# Patient Record
Sex: FEMALE | Race: BLACK OR AFRICAN AMERICAN
[De-identification: names, ages, dates, MRNs, and addresses within clinical notes are randomized per-mention and may not be internally consistent; named-entity substitution may affect disease eponyms.]

---

## 2020-03-23 ENCOUNTER — HOSPITAL ENCOUNTER (OUTPATIENT)
Dept: HOSPITAL 63 - EKG | Age: 70
Discharge: HOME | End: 2020-03-23
Attending: FAMILY MEDICINE
Payer: MEDICARE

## 2020-03-23 DIAGNOSIS — I44.4: Primary | ICD-10-CM

## 2020-03-23 DIAGNOSIS — R94.31: ICD-10-CM

## 2020-03-23 DIAGNOSIS — J45.991: ICD-10-CM

## 2020-03-23 PROCEDURE — 71046 X-RAY EXAM CHEST 2 VIEWS: CPT

## 2020-03-23 PROCEDURE — 93005 ELECTROCARDIOGRAM TRACING: CPT

## 2020-03-23 NOTE — EKG
[facility] [address], [place] [address]

Test Date:    2020               Test Time:    17:41:54

Pat Name:     MARLENE CLAIRE        Department:   

Patient ID:   [id number]           Room:          

Gender:       F                        Technician:   

:          1950               Requested By: DAVY VALLE

Order Number: [id number]            Reading MD:     

                                 Measurements

Intervals                              Axis          

Rate:         70                       P:            40

WY:           130                      QRS:          -42

QRSD:         90                       T:            -9

QT:           374                                    

QTc:          407                                    

                           Interpretive Statements

SINUS RHYTHM

ABNORMAL LEFT AXIS DEVIATION

LEFT ANTERIOR FASCICULAR BLOCK

CONSIDER LEFT VENTRICULAR HYPERTROPHY

QRS(T) CONTOUR ABNORMALITY

CONSIDER ANTEROSEPTAL MYOCARDIAL DAMAGE

T ABNORMALITY IN INFERIOR LEADS

ABNORMAL ECG

RI6.01

No previous ECG available for comparison

## 2020-03-23 NOTE — RAD
Study: CR CHEST PA   LATERAL

 

Indication: Cough. Chest pain. Recent travel to [place].

 

Comparison: None.

 

Findings:

 

The cardiomediastinal silhouette is borderline prominent in size. Mild 

tortuosity of the thoracic aorta. The rusty are symmetric. No lobar 

consolidation, pleural effusion or pneumothorax. Mild asymmetric elevation

of the right hemidiaphragm.

 

Impression:

 

No acute radiographic abnormality of the chest. The cardiomediastinal 

silhouette is at the upper limits of normal for size.

 

Electronically signed by: LEON ZIMMERMAN MD (3/23/2020 10:38 PM) [id number] Never smoker

## 2020-03-25 ENCOUNTER — HOSPITAL ENCOUNTER (OUTPATIENT)
Dept: HOSPITAL 63 - CT | Age: 70
Discharge: HOME | End: 2020-03-25
Attending: FAMILY MEDICINE
Payer: MEDICARE

## 2020-03-25 DIAGNOSIS — J98.11: Primary | ICD-10-CM

## 2020-03-25 DIAGNOSIS — J45.991: ICD-10-CM

## 2020-03-25 PROCEDURE — 71275 CT ANGIOGRAPHY CHEST: CPT

## 2020-03-25 NOTE — RAD
Examination: CT angiography chest

 

HISTORY: History of chest pain, cough

 

COMPARISON: None available

 

TECHNIQUE: Axial CT angiographic images of chest were performed with IV 

contrast. Coronal and sagittal 3-D MIP reformats are performed

 

 

Exposure: One or more of the following individualized dose reduction 

techniques were utilized for this examination:  1. Automated exposure 

control  2. Adjustment of the mA and/or kV according to patient size  3. 

Use of iterative reconstruction technique

 

FINDINGS:

 

 

The central airways are patent. The ascending aorta measures 4 cm in 

transverse dimension. No evidence of filling defect identified in the main

pulmonary arterial trunk and right and left main pulmonary arteries and 

the visualized lobar, segmental branches of the pulmonary arteries. 

Minimal bibasilar lung atelectasis. The liver, spleen, grossly appears 

unremarkable. Mild degenerative changes thoracic spine. Moderate 

degenerative changes thoracic spine.

 

IMPRESSION:

 

1. No evidence of pulmonary embolism.

 

2. Minimal bibasilar lung atelectasis.

 

Electronically signed by: Phong Chua MD (3/25/2020 12:38 PM) [id number]

## 2021-06-16 ENCOUNTER — HOSPITAL ENCOUNTER (EMERGENCY)
Dept: HOSPITAL 63 - ER | Age: 71
Discharge: HOME | End: 2021-06-16
Payer: MEDICARE

## 2021-06-16 VITALS — HEIGHT: 63 IN | BODY MASS INDEX: 34.61 KG/M2 | WEIGHT: 195.33 LBS

## 2021-06-16 VITALS — SYSTOLIC BLOOD PRESSURE: 124 MMHG | DIASTOLIC BLOOD PRESSURE: 78 MMHG

## 2021-06-16 DIAGNOSIS — S80.12XA: Primary | ICD-10-CM

## 2021-06-16 DIAGNOSIS — R22.42: ICD-10-CM
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
## 2021-06-16 DIAGNOSIS — I10: ICD-10-CM
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
## 2021-06-16 DIAGNOSIS — Z96.651: ICD-10-CM
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
## 2021-06-16 PROCEDURE — 93971 EXTREMITY STUDY: CPT

## 2021-06-16 PROCEDURE — 99284 EMERGENCY DEPT VISIT MOD MDM: CPT

## 2021-06-16 NOTE — RAD
Left lower extremity venous Doppler dated 6/16/2021



COMPARISON: none.



CLINICAL INDICATION: Left lower extremity swelling



FINDINGS:



Grayscale, color-flow and spectral waveform analysis performed to include the deep venous system of l
eft lower extremity. There is normal compressibility, phasicity and augmentation of flow throughout. 
No filling defects are seen.



IMPRESSION:



No evidence of left lower extremity deep vein thrombosis.



Electronically signed by: Kit Foss MD (6/16/2021 5:31 PM) [id number]
